# Patient Record
Sex: FEMALE | Race: WHITE | HISPANIC OR LATINO | URBAN - METROPOLITAN AREA
[De-identification: names, ages, dates, MRNs, and addresses within clinical notes are randomized per-mention and may not be internally consistent; named-entity substitution may affect disease eponyms.]

---

## 2023-02-11 ENCOUNTER — EMERGENCY (EMERGENCY)
Facility: HOSPITAL | Age: 53
LOS: 1 days | End: 2023-02-11
Payer: MEDICAID

## 2023-02-11 VITALS
SYSTOLIC BLOOD PRESSURE: 135 MMHG | RESPIRATION RATE: 18 BRPM | TEMPERATURE: 97 F | HEART RATE: 80 BPM | OXYGEN SATURATION: 100 % | DIASTOLIC BLOOD PRESSURE: 84 MMHG

## 2023-02-11 PROCEDURE — L9991: CPT

## 2024-09-05 ENCOUNTER — EMERGENCY (EMERGENCY)
Facility: HOSPITAL | Age: 54
LOS: 1 days | Discharge: HOME | End: 2024-09-05
Attending: STUDENT IN AN ORGANIZED HEALTH CARE EDUCATION/TRAINING PROGRAM
Payer: COMMERCIAL

## 2024-09-05 VITALS
SYSTOLIC BLOOD PRESSURE: 187 MMHG | RESPIRATION RATE: 17 BRPM | DIASTOLIC BLOOD PRESSURE: 97 MMHG | TEMPERATURE: 98 F | HEART RATE: 95 BPM | OXYGEN SATURATION: 100 %

## 2024-09-05 PROCEDURE — 99285 EMERGENCY DEPT VISIT HI MDM: CPT | Mod: 25

## 2024-09-05 PROCEDURE — 93010 ELECTROCARDIOGRAM REPORT: CPT

## 2024-09-05 NOTE — ED ADULT TRIAGE NOTE - AVIAN FLU WORK
No
monitor on telemetry, IV fluids, CIWA protocol, benzodiazepines, monitor and replace vitamins and electrolytes

## 2024-09-06 VITALS
TEMPERATURE: 98 F | OXYGEN SATURATION: 98 % | SYSTOLIC BLOOD PRESSURE: 156 MMHG | RESPIRATION RATE: 18 BRPM | HEART RATE: 87 BPM | DIASTOLIC BLOOD PRESSURE: 94 MMHG

## 2024-09-06 LAB
ALBUMIN SERPL ELPH-MCNC: 4.4 G/DL — SIGNIFICANT CHANGE UP (ref 3.3–5.2)
ALP SERPL-CCNC: 123 U/L — HIGH (ref 40–120)
ALT FLD-CCNC: 25 U/L — SIGNIFICANT CHANGE UP
ANION GAP SERPL CALC-SCNC: 12 MMOL/L — SIGNIFICANT CHANGE UP (ref 5–17)
AST SERPL-CCNC: 23 U/L — SIGNIFICANT CHANGE UP
BASOPHILS # BLD AUTO: 0.05 K/UL — SIGNIFICANT CHANGE UP (ref 0–0.2)
BASOPHILS NFR BLD AUTO: 0.6 % — SIGNIFICANT CHANGE UP (ref 0–2)
BILIRUB SERPL-MCNC: 0.3 MG/DL — LOW (ref 0.4–2)
BUN SERPL-MCNC: 13.7 MG/DL — SIGNIFICANT CHANGE UP (ref 8–20)
CALCIUM SERPL-MCNC: 9.8 MG/DL — SIGNIFICANT CHANGE UP (ref 8.4–10.5)
CHLORIDE SERPL-SCNC: 102 MMOL/L — SIGNIFICANT CHANGE UP (ref 96–108)
CO2 SERPL-SCNC: 25 MMOL/L — SIGNIFICANT CHANGE UP (ref 22–29)
CREAT SERPL-MCNC: 0.52 MG/DL — SIGNIFICANT CHANGE UP (ref 0.5–1.3)
EGFR: 111 ML/MIN/1.73M2 — SIGNIFICANT CHANGE UP
EOSINOPHIL # BLD AUTO: 0.17 K/UL — SIGNIFICANT CHANGE UP (ref 0–0.5)
EOSINOPHIL NFR BLD AUTO: 1.9 % — SIGNIFICANT CHANGE UP (ref 0–6)
GLUCOSE SERPL-MCNC: 136 MG/DL — HIGH (ref 70–99)
HCT VFR BLD CALC: 40.2 % — SIGNIFICANT CHANGE UP (ref 34.5–45)
HGB BLD-MCNC: 13.4 G/DL — SIGNIFICANT CHANGE UP (ref 11.5–15.5)
IMM GRANULOCYTES NFR BLD AUTO: 0.2 % — SIGNIFICANT CHANGE UP (ref 0–0.9)
LIDOCAIN IGE QN: 38 U/L — SIGNIFICANT CHANGE UP (ref 22–51)
LYMPHOCYTES # BLD AUTO: 2.58 K/UL — SIGNIFICANT CHANGE UP (ref 1–3.3)
LYMPHOCYTES # BLD AUTO: 29.5 % — SIGNIFICANT CHANGE UP (ref 13–44)
MCHC RBC-ENTMCNC: 29.1 PG — SIGNIFICANT CHANGE UP (ref 27–34)
MCHC RBC-ENTMCNC: 33.3 GM/DL — SIGNIFICANT CHANGE UP (ref 32–36)
MCV RBC AUTO: 87.4 FL — SIGNIFICANT CHANGE UP (ref 80–100)
MONOCYTES # BLD AUTO: 0.67 K/UL — SIGNIFICANT CHANGE UP (ref 0–0.9)
MONOCYTES NFR BLD AUTO: 7.6 % — SIGNIFICANT CHANGE UP (ref 2–14)
NEUTROPHILS # BLD AUTO: 5.27 K/UL — SIGNIFICANT CHANGE UP (ref 1.8–7.4)
NEUTROPHILS NFR BLD AUTO: 60.2 % — SIGNIFICANT CHANGE UP (ref 43–77)
PLATELET # BLD AUTO: 230 K/UL — SIGNIFICANT CHANGE UP (ref 150–400)
POTASSIUM SERPL-MCNC: 4.1 MMOL/L — SIGNIFICANT CHANGE UP (ref 3.5–5.3)
POTASSIUM SERPL-SCNC: 4.1 MMOL/L — SIGNIFICANT CHANGE UP (ref 3.5–5.3)
PROT SERPL-MCNC: 7.4 G/DL — SIGNIFICANT CHANGE UP (ref 6.6–8.7)
RBC # BLD: 4.6 M/UL — SIGNIFICANT CHANGE UP (ref 3.8–5.2)
RBC # FLD: 13.3 % — SIGNIFICANT CHANGE UP (ref 10.3–14.5)
SODIUM SERPL-SCNC: 138 MMOL/L — SIGNIFICANT CHANGE UP (ref 135–145)
T4 AB SER-ACNC: 6.6 UG/DL — SIGNIFICANT CHANGE UP (ref 4.5–12)
TROPONIN T, HIGH SENSITIVITY RESULT: <6 NG/L — SIGNIFICANT CHANGE UP (ref 0–51)
TSH SERPL-MCNC: 1.13 UIU/ML — SIGNIFICANT CHANGE UP (ref 0.27–4.2)
WBC # BLD: 8.76 K/UL — SIGNIFICANT CHANGE UP (ref 3.8–10.5)
WBC # FLD AUTO: 8.76 K/UL — SIGNIFICANT CHANGE UP (ref 3.8–10.5)

## 2024-09-06 PROCEDURE — 36415 COLL VENOUS BLD VENIPUNCTURE: CPT

## 2024-09-06 PROCEDURE — 84436 ASSAY OF TOTAL THYROXINE: CPT

## 2024-09-06 PROCEDURE — 99285 EMERGENCY DEPT VISIT HI MDM: CPT | Mod: 25

## 2024-09-06 PROCEDURE — 80053 COMPREHEN METABOLIC PANEL: CPT

## 2024-09-06 PROCEDURE — T1013: CPT

## 2024-09-06 PROCEDURE — 83690 ASSAY OF LIPASE: CPT

## 2024-09-06 PROCEDURE — 84484 ASSAY OF TROPONIN QUANT: CPT

## 2024-09-06 PROCEDURE — 93005 ELECTROCARDIOGRAM TRACING: CPT

## 2024-09-06 PROCEDURE — 85025 COMPLETE CBC W/AUTO DIFF WBC: CPT

## 2024-09-06 PROCEDURE — 71046 X-RAY EXAM CHEST 2 VIEWS: CPT

## 2024-09-06 PROCEDURE — 71046 X-RAY EXAM CHEST 2 VIEWS: CPT | Mod: 26

## 2024-09-06 PROCEDURE — 84443 ASSAY THYROID STIM HORMONE: CPT

## 2024-09-06 RX ORDER — ASPIRIN 81 MG
162 TABLET, DELAYED RELEASE (ENTERIC COATED) ORAL ONCE
Refills: 0 | Status: ACTIVE | OUTPATIENT
Start: 2024-09-06 | End: 2024-09-06

## 2024-09-06 NOTE — ED PROVIDER NOTE - OBJECTIVE STATEMENT
54-year-old female with a past medical history of hypertension on losartan 50 mg once a day presents with chest pain.  Patient states earlier today she took her blood pressure and noticed it was high started to have some chest pressure 2 times throughout the day resolved spontaneously unable to identify alleviating or exacerbating factors.  Patient states along with the chest pressure she felt shortness of breath.  Patient unable to identify alleviating exacerbating factors of the shortness of breath. Pt denies fevers/chills, ha, loc, focal neuro deficits, cough, abd pain/n/v/d, urinary symptoms, recent travel and sick contacts.

## 2024-09-06 NOTE — ED PROVIDER NOTE - PATIENT PORTAL LINK FT
You can access the FollowMyHealth Patient Portal offered by [facility] by registering at the following website: [email or web address]. By joining [employer]’s FollowMyHealth portal, you will also be able to view your health information using other applications (apps) compatible with our system.

## 2024-09-06 NOTE — ED ADULT NURSE NOTE - DRUG PRE-SCREENING (DAST -1)
Statement Selected EKG: a-flutter @ 123, RBBB                          13.1   5.31  )-----------( 152      ( 28 Jul 2020 16:43 )             41.0       07-28    138  |  101  |  34<H>  ----------------------------<  93  4.6   |  27  |  1.53<H>    Ca    10.0      28 Jul 2020 16:43    TPro  7.1  /  Alb  4.1  /  TBili  1.7<H>  /  DBili  x   /  AST  30  /  ALT  24  /  AlkPhos  141<H>  07-28    CARDIAC MARKERS ( 28 Jul 2020 16:43 )  x     / 0.08 ng/mL / 218 U/L / x     / x

## 2024-09-06 NOTE — ED PROVIDER NOTE - CLINICAL SUMMARY MEDICAL DECISION MAKING FREE TEXT BOX
54-year-old female with a past medical history of hypertension on losartan 50 mg once a day presents with chest pain.     EKG no acute ischemic changes normal sinus rhythm at a rate of 94  QRS 96 QTc 452 54-year-old female with a past medical history of hypertension on losartan 50 mg once a day presents with chest pain.     EKG no acute ischemic changes normal sinus rhythm at a rate of 94  QRS 96 QTc 452  No acute findings on chest x-ray labs normal negative troponin patient structured to follow-up with her PCP since last time I saw him was over a year ago potentially needing increase in antihypertensive or change of antihypertensive patient well-appearing stable for DC with follow-up

## 2024-09-06 NOTE — ED PROVIDER NOTE - NSFOLLOWUPINSTRUCTIONS_ED_ALL_ED_FT
Dolor de pecho inespecífico      El dolor de pecho puede deberse a muchas afecciones diferentes. Siempre existe nany posibilidad de que el dolor esté relacionado con algo grave, joel un infarto de miocardio o un coágulo sanguíneo en los pulmones. Hay diferentes afecciones que no son potencialmente mortales que pueden causar dolor de pecho. Si tiene dolor de pecho, es muy importante que se controle con el médico.     ¿Cuáles son las causas?  Entre las causas de esta afección se incluyen las siguientes:    Acidez estomacal.  Neumonía o bronquitis.  Ansiedad o estrés.  Inflamación de la alexandr que rodea el corazón (pericarditis) o los pulmones (pleuritis o pleuresía).  Un coágulo sanguíneo en el pulmón.  Pulmón colapsado (neumotórax). Puede aparecer de manera repentina por sí solo (neumotórax espontáneo) o debido a un traumatismo en el tórax.  Culebrilla (virus de la varicela zóster).  Infarto de miocardio.  Daño de los huesos, los músculos y los cartílagos que conforman la pared torácica. [place] puede incluir lo siguiente:  Hematomas óseos debido a lesiones.  Distensiones musculares o de los cartílagos por tos frecuente o repetida, o por exceso de trabajo.  Fractura de nany o más costillas.  Dolor de cartílago debido a inflamación (costocondritis).    ¿Qué incrementa el riesgo?  Entre los factores de riesgo de esta afección se pueden incluir los siguientes:    Actividades que incrementan el riesgo de sufrir traumatismos o lesiones en el tórax.  Infecciones o enfermedades respiratorias que causan tos frecuente.  Afecciones o excesos en las comidas que pueden causar acidez estomacal.  Cardiopatías coronarias o antecedentes familiares de enfermedades cardíacas.  Afecciones o comportamientos de stephanie que aumentan el riesgo de tener un coágulo sanguíneo.  [place] tenido varicela (varicela zóster).    ¿Cuáles son los signos o los síntomas?  El dolor de pecho puede provocar las siguientes sensaciones:    Ardor u hormigueo en la superficie o en lo profundo del pecho.  Dolor opresivo, continuo o constrictivo.  Dolor vago o intenso que empeora al moverse, toser o inhalar profundamente.  Dolor que también se siente en la espalda, el nimisha, el hombro o el brazo, o dolor que se extiende a cualquiera de estas zonas.    El dolor de pecho puede aparecer y desaparecer, o narayan puede ser shraddha.    ¿Cómo se diagnostica?  Quizás se necesiten análisis de laboratorio u otros estudios para encontrar la causa del dolor. El médico puede indicarle que se nolan nany prueba llamada ECG (electrocardiograma). El electrocardiograma registra los patrones de los latidos cardíacos en el momento en que se realiza el estudio. También pueden hacerle otros estudios, por ejemplo:    Ecocardiograma transtorácico (ETT). En samantha estudio, se usan ondas sonoras para crear nany imagen de las estructuras cardíacas y evaluar cómo circula la zoraida por el corazón.  Ecocardiografía transesofágica (ETE). Sam[place] es un estudio de diagnóstico por imágenes más avanzado mediante el cual se shaan imágenes del interior del cuerpo. Le permite al médico yi el corazón con mayor detalle.  Monitoreo cardíaco. Permite que el médico controle la frecuencia y el ritmo cardíacos en tiempo real.  Monitor Holter. Es un dispositivo portátil que registra los latidos del corazón y puede ayudar a diagnosticar los latidos cardíacos anómalos. Le permite al médico registrar la actividad cardíaca emeterio varios días, si es necesario.  Pruebas de esfuerzo. Estas pueden realizarse emeterio el ejercicio o mediante la administración de un medicamento que acelera los latidos del corazón.  Análisis de zoraida.  Otros estudios de diagnóstico por imágenes.    ¿Cómo se trata?  El tratamiento depende de la causa del dolor de pecho. El tratamiento puede incluir lo siguiente:    Medicamentos. Estos pueden incluir, entre otros, los siguientes:  Inhibidores de la acidez estomacal.  Antiinflamatorios.  Analgésicos para las enfermedades inflamatorias.  Antibióticos, si hay nany infección.  Medicamentos para disolver los coágulos sanguíneos.  Medicamentos para tratar la enfermedad arterial coronaria (EAC).  Tratamiento complementario para las enfermedades que no requieren la kera de medicamentos. Algunas opciones de samantha tipo de tratamiento incluyen las siguientes:  Hacer reposo.  Aplicar compresas frías o calientes en las zonas lesionadas.  Limitar las actividades hasta que disminuya el dolor.    Siga estas indicaciones en cope casa:  Medicamentos    Si le recetaron un antibiótico, tómelo joel se lo haya indicado el médico. No deje de catrina el antibiótico aunque comience a sentirse mejor.  [place] los medicamentos de venta greg y los recetados solamente joel se lo haya indicado el médico.    Estilo de dianne    No consuma ningún producto que contenga nicotina o tabaco, joel cigarrillos y cigarrillos electrónicos. Si necesita ayuda para dejar de fumar, consulte al médico.  No linette alcohol.  Nolan cambios en cope estilo de dianne joel se lo haya indicado el médico. Estos pueden incluir, entre otros, los siguientes:   Practicar actividad física con regularidad. Pida al médico que le sugiera algunas actividades que patrick seguras para usted.  Consumir nany dieta cardiosaludable. Un nutricionista matriculado puede ayudarlo a hacer elecciones saludables.  Mantener un peso saludable.  Controlar la diabetes, si es necesario.  Disminuir el nivel de estrés; por ejemplo, con yoga o técnicas de relajación.    Instrucciones generales    Evite las actividades que le causen dolor de pecho.  Si la causa del dolor de pecho es la acidez estomacal, levante (eleve) la cabecera de la cama aproximadamente 6 pulgadas (15 cm) colocando bloques debajo de las patas. Usar más almohadas para dormir no es efectivo para aliviar la acidez estomacal porque solo modificaría la posición de la sandra.  Concurra a todas las visitas de seguimiento joel se lo haya indicado el médico. [place] es importante. [place] incluye otros estudios si el dolor de pecho no desaparece.    Comuníquese con un médico si:  El dolor de pecho no desaparece.  Tiene nany erupción cutánea con ampollas en el pecho.  Tiene fiebre.  Tiene escalofríos.    Solicite ayuda de inmediato si:  El dolor en el pecho es más intenso.  Tiene tos que empeora o tose con zoraida.  Siente un dolor intenso en el abdomen.  Siente debilidad intensa.  Se desmaya.  Tiene nany molestia repentina e inexplicable en el pecho.  Tiene molestias repentinas e inexplicables en los brazos, la espalda, el nimisha o la mandíbula.  Le falta el aire en cualquier momento.  Comienza a sudar de manera repentina o la piel se le humedece.  Siente náuseas o vomita.  Se siente repentinamente mareado o se desmaya.  Siente que el corazón comienza a latir rápidamente o que se saltea latidos.    Estos síntomas pueden representar un problema grave que constituye nany emergencia. No espere hasta que los síntomas desaparezcan. Solicite atención médica de inmediato. Comuníquese con el servicio de emergencias de cope localidad (911 en los [place]). No conduzca por ariel propios medios hasta el hospital.    NOTAS ADICIONALES E INSTRUCCIONES    Por favor tenga seguimiento con cope doctor primario entre 2 villalba.  Regrese a urgencias por cualquier preocupacion medica.      Hipertensión en los adultos  Hypertension, Adult    La presión arterial ying (hipertensión) se produce cuando la fuerza de la zoraida bombea a través de las arterias con mucha fuerza. Las arterias son los vasos sanguíneos que transportan la zoraida desde el corazón al adiel del cuerpo. La hipertensión hace que el corazón nolan más esfuerzo para bombear zoraida y puede provocar que las arterias se estrechen o endurezcan. La hipertensión no tratada o no controlada puede causar infarto de miocardio, insuficiencia cardíaca, accidente cerebrovascular, enfermedad renal y otros problemas.    Nany lectura de la presión arterial consta de un número más alto sobre un número más bajo. En condiciones ideales, la presión arterial debe estar por debajo de 120/80. El primer número (“superior”) es la presión sistólica. Es la medida de la presión de las arterias cuando el corazón late. El nemesio número (“inferior”) es la presión diastólica. Es la medida de la presión en las arterias cuando el corazón se relaja.    ¿Cuáles son las causas?  Se desconoce la causa exacta de esta afección. Hay algunas afecciones que causan presión arterial ying o están relacionadas con megha.    ¿Qué incrementa el riesgo?  Algunos factores de riesgo de hipertensión están bajo cope control. Los siguientes factores pueden hacer que sea más propenso a desarrollar esta afección:    Fumar.  Tener diabetes mellitus tipo 2, colesterol alto, o ambos.  No hacer la cantidad suficiente de actividad física o ejercicio.  Tener sobrepeso.  Consumir mucha grasa, azúcar, calorías o sal (sodio) en cope dieta.  Beber alcohol en exceso.    Algunos factores de riesgo para la presión arterial ying pueden ser difíciles o imposibles de cambiar. Algunos de estos factores son los siguientes:    Tener enfermedad renal crónica.  Tener antecedentes familiares de presión arterial ying.  Edad. Los riesgos aumentan con la edad.  [place]. El riesgo es mayor para las personas afroamericanas.  Sexo. Antes de los 45 años, los hombres corren más riesgo que las mujeres. Después de los 65 años, las mujeres corren más riesgo que los hombres.  Tener apnea obstructiva del sueño.  Estrés.    ¿Cuáles son los signos o los síntomas?  Es posible que la presión arterial ying puede no cause síntomas. La presión arterial muy ying (crisis hipertensiva) puede provocar:    Dolor de sandra.  Ansiedad.  Falta de aire.  Hemorragia nasal.  Náuseas y vómitos.  Cambios en la visión.  Dolor de pecho intenso.  Convulsiones.    ¿Cómo se diagnostica?  Esta afección se diagnostica al medir cope presión arterial mientras se encuentra sentado, con el brazo apoyado sobre nany superficie plana, las piernas sin cruzar y los pies narayan apoyados en el piso. El brazalete del tensiómetro debe colocarse directamente sobre la piel de la parte superior del brazo y al nivel de cope corazón. Debe medirla al menos dos veces en el mismo brazo. Determinadas condiciones pueden causar nany diferencia de presión arterial entre el brazo rubin y el derecho.    Ciertos factores pueden provocar que las lecturas de la presión arterial patrick inferiores o superiores a lo normal por un período corto de tiempo:    Si cope presión arterial es más ying cuando se encuentra en el consultorio del médico que cuando la mide en cope hogar, se denomina “hipertensión de bata paras”. La mayoría de las personas que tienen esta afección no deben ser medicadas.  Si cope presión arterial es más ying en el hogar que cuando se encuentra en el consultorio del médico, se denomina “hipertensión enmascarada”. La mayoría de las personas que tienen esta afección deben ser medicadas para controlar la presión arterial.    Si tiene nany lecturas de presión arterial ying emeterio nany visita o si tiene presión arterial normal con otros factores de riesgo, se le podrá pedir que nolan lo siguiente:    Que regrese otro día para volver a controlar cope presión arterial nuevamente.  Que se controle la presión arterial en cope casa emeterio 1 semana o más.    Si se le diagnostica hipertensión, es posible que se le realicen otros análisis de zoraida o estudios de diagnóstico por imágenes para ayudar a cope médico a comprender cope riesgo general de tener otras afecciones.    ¿Cómo se trata?  Esta afección se trata haciendo cambios saludables en el estilo de dianne, tales joel ingerir alimentos saludables, realizar más ejercicio y reducir el consumo de alcohol. El médico puede recetarle medicamentos si los cambios en el estilo de dianne no son suficientes para lograr controlar la presión arterial y si:    Cope presión arterial sistólica está por encima de 130.  Cope presión arterial diastólica está por encima de 80.    La presión arterial deseada puede variar en función de las enfermedades, la edad y otros factores personales.    Siga estas instrucciones en cope casa:      Comida y bebida     Siga nany dieta con alto contenido de fibras y potasio, y con bajo contenido de sodio, azúcar agregada y grasas. Un ejemplo de plan alimenticio es la dieta DASH (Dietary Approaches to Stop Hypertension, Métodos alimenticios para detener la hipertensión). Para alimentarse de esta manera:    Coma mucha fruta y verdura fresca. Trate de que la mitad del plato de cada comida sea de frutas y verduras.  Coma cereales integrales, joel pasta integral, arroz integral o pan integral. Llene aproximadamente un cuarto del plato con cereales integrales.  Coma y linette productos lácteos con bajo contenido de grasa, joel leche descremada o yogur bajo en grasas.  Evite la ingesta de marroquin de carne grasa, carne procesada o curada, y carne de ave con piel. Llene aproximadamente un cuarto del plato con proteínas magras, joel pescado, celia sin piel, frijoles, huevos o tofu.  Evite ingerir alimentos prehechos y procesados. En general, estos tienen mayor cantidad de sodio, azúcar agregada y grasa.  Reduzca cope ingesta diaria de sodio. La mayoría de las personas que tienen hipertensión deben comer menos de 1500 mg de sodio por día.  No linette alcohol si:    Cope médico le indica no hacerlo.  Está embarazada, puede estar embarazada o está tratando de quedar embarazada.  Si lori alcohol:    Limite la cantidad que lori a lo siguiente:    De 0 a 1 medida por día para las mujeres.  De 0 a 2 medidas por día para los hombres.  Esté atento a la cantidad de alcohol que hay en las bebidas que kera. En los [place], nany medida equivale a nany botella de cerveza de 12 oz (355 ml), un vaso de vino de 5 oz (148 ml) o un vaso de nany bebida alcohólica de ying graduación de 1½ oz (44 ml).        Estilo de dianne     Trabaje con cope médico para mantener un peso saludable o perder peso. Pregúntele cuál es el peso recomendado para usted.  Nolan al menos 30 minutos de ejercicio la mayoría de los días de la semana. Estas actividades pueden incluir caminar, nadar o andar en bicicleta.  Incluya ejercicios para fortalecer ariel músculos (ejercicios de resistencia), joel [place] o levantamiento de pesas, joel parte de cope rutina semanal de ejercicios. Intente realizar 30 minutos de samantha tipo de ejercicios al menos luz maria días a la semana.  No consuma ningún producto que contenga nicotina o tabaco, joel cigarrillos, cigarrillos electrónicos y tabaco de mascar. Si necesita ayuda para dejar de fumar, consulte al médico.  Contrólese la presión arterial en cope casa según las indicaciones del médico.  Concurra a todas las visitas de seguimiento joel se lo haya indicado el médico. [place] es importante.        Medicamentos    [place] los medicamentos de venta greg y los recetados solamente joel se lo haya indicado el médico. Siga cuidadosamente las indicaciones. Los medicamentos para la presión arterial deben tomarse según las indicaciones.  No omita las dosis de medicamentos para la presión arterial. Si lo hace, estará en riesgo de tener problemas y puede hacer que los medicamentos patirck menos eficaces.  Pregúntele a cope médico a qué efectos secundarios o reacciones a los medicamentos debe prestar atención.    Comuníquese con un médico si:  Piensa que tiene nany reacción a un medicamento que está tomando.  Tiene xiang de sandra frecuentes (recurrentes).  Se siente mareado.  Tiene hinchazón en los tobillos.  Tiene problemas de visión.    Solicite ayuda inmediatamente si:  Siente un dolor de sandra intenso o confusión.  Siente debilidad inusual o adormecimiento.  Siente que va a desmayarse.  Siente un dolor intenso en el pecho o el abdomen.  Vomita repetidas veces.  Tiene dificultad para respirar.    Resumen  La hipertensión se produce cuando la zoraida bombea en las arterias con mucha fuerza. Si esta afección no se controla, podría correr riesgo de tener complicaciones graves.  La presión arterial deseada puede variar en función de las enfermedades, la edad y otros factores personales. Para la mayoría de las personas, nany presión arterial normal es jeannie que 120/80.  La hipertensión se trata con cambios en el estilo de dianne, medicamentos o nany combinación de ambos. Los cambios en el estilo de dianne incluyen pérdida de peso, ingerir alimentos sanos, seguir nany dieta baja en sodio, hacer más ejercicio y limitar el consumo de alcohol.

## 2024-09-06 NOTE — ED ADULT NURSE NOTE - NSFALLUNIVINTERV_ED_ALL_ED
Bed/Stretcher in lowest position, wheels locked, appropriate side rails in place/Call bell, personal items and telephone in reach/Instruct patient to call for assistance before getting out of bed/chair/stretcher/Non-slip footwear applied when patient is off stretcher/[place] to call system/Physically safe environment - no spills, clutter or unnecessary equipment/Purposeful proactive rounding/Room/bathroom lighting operational, light cord in reach

## 2024-09-06 NOTE — ED PROVIDER NOTE - ED STEMI HIDDEN
----- Message from Darnell Quezada MD sent at 9/4/2024 10:08 AM CDT -----  Because biopsy is negative and is an inflamed seborrheic keratosis.  
Spoke with patient and informed her of the below result and recommendation. She verbalized understanding.       
hide

## 2024-09-06 NOTE — ED ADULT NURSE NOTE - OBJECTIVE STATEMENT
Pt presents to the ed c/o left sided chest pain that began at 1900. Pt states she was just getting home from work when the pain began. Pt ax4, reports history of htn compliant with meds. Vitals as documented. Tele/ maintained, nsr, 100%RA. All safety/infection/fall precautions maintained.